# Patient Record
Sex: FEMALE | Race: WHITE | NOT HISPANIC OR LATINO | URBAN - METROPOLITAN AREA
[De-identification: names, ages, dates, MRNs, and addresses within clinical notes are randomized per-mention and may not be internally consistent; named-entity substitution may affect disease eponyms.]

---

## 2021-12-02 NOTE — PATIENT DISCUSSION
Patient in room PCU 3022. I have received report from  Rachna ARRINGTON RN  and had the opportunity 
to ask questions and assume patient care. significant myopic shift OS greater than OD. Change progressives and possibly get separate reading. Patient wishes to continue with contact lenses. Contact lens trials dispensed today. Recheck in one week. Explained limitations. Explained prescriptions will likely change more frequently.

## 2021-12-28 NOTE — PATIENT DISCUSSION
excellent fitting with one day. Modified prescription slightly increasing reading. Contact lens prescription finalized. developing cataracts with myopic shift. Explained limitations. Recommend progressive lenses and separate reading eyeglasses. Patient wishes to have the contact lens option.

## 2021-12-28 NOTE — PATIENT DISCUSSION
significant myopic shift OS greater than OD. Change progressives and possibly get separate reading. Patient wishes to continue with contact lenses. Contact lens trials dispensed today. Recheck in one week. Explained limitations. Explained prescriptions will likely change more frequently.

## 2022-12-07 ENCOUNTER — NEW PATIENT (OUTPATIENT)
Dept: URBAN - METROPOLITAN AREA CLINIC 31 | Facility: CLINIC | Age: 57
End: 2022-12-07

## 2022-12-07 PROCEDURE — 92015 DETERMINE REFRACTIVE STATE: CPT

## 2022-12-07 PROCEDURE — 92004 COMPRE OPH EXAM NEW PT 1/>: CPT

## 2022-12-07 ASSESSMENT — VISUAL ACUITY
OD_CC: J2
OS_SC: 20/20-3
OS_CC: J1
OD_SC: 20/20
OS_SC: J7
OD_SC: J10

## 2022-12-07 ASSESSMENT — TONOMETRY
OD_IOP_MMHG: 15
OS_IOP_MMHG: 15

## 2023-12-01 ENCOUNTER — COMPREHENSIVE EXAM (OUTPATIENT)
Dept: URBAN - METROPOLITAN AREA CLINIC 31 | Facility: CLINIC | Age: 58
End: 2023-12-01

## 2023-12-01 DIAGNOSIS — H52.4: ICD-10-CM

## 2023-12-01 PROCEDURE — 92014 COMPRE OPH EXAM EST PT 1/>: CPT

## 2023-12-01 PROCEDURE — 92015 DETERMINE REFRACTIVE STATE: CPT

## 2023-12-01 ASSESSMENT — VISUAL ACUITY
OD_SC: 20/20
OD_CC: J1
OS_CC: J1
OS_SC: 20/20-1

## 2023-12-01 ASSESSMENT — TONOMETRY
OD_IOP_MMHG: 12
OS_IOP_MMHG: 12

## 2024-11-11 ENCOUNTER — FOLLOW UP (OUTPATIENT)
Dept: URBAN - METROPOLITAN AREA CLINIC 31 | Facility: CLINIC | Age: 59
End: 2024-11-11

## 2024-11-11 DIAGNOSIS — H40.013: ICD-10-CM

## 2024-11-11 PROCEDURE — 99213 OFFICE O/P EST LOW 20 MIN: CPT

## 2024-11-11 PROCEDURE — 92133 CPTRZD OPH DX IMG PST SGM ON: CPT

## 2024-11-11 PROCEDURE — 92083 EXTENDED VISUAL FIELD XM: CPT

## 2024-11-11 PROCEDURE — 76514 ECHO EXAM OF EYE THICKNESS: CPT

## 2024-12-23 ENCOUNTER — CONSULTATION/EVALUATION (OUTPATIENT)
Age: 59
End: 2024-12-23

## 2024-12-23 DIAGNOSIS — H25.13: ICD-10-CM

## 2024-12-23 DIAGNOSIS — H40.013: ICD-10-CM

## 2024-12-23 PROCEDURE — 99214 OFFICE O/P EST MOD 30 MIN: CPT

## 2024-12-23 PROCEDURE — 76514 ECHO EXAM OF EYE THICKNESS: CPT
